# Patient Record
Sex: MALE | Race: WHITE | URBAN - METROPOLITAN AREA
[De-identification: names, ages, dates, MRNs, and addresses within clinical notes are randomized per-mention and may not be internally consistent; named-entity substitution may affect disease eponyms.]

---

## 2017-07-19 DIAGNOSIS — N35.9 URETHRAL STRICTURE, UNSPECIFIED STRICTURE TYPE: Primary | ICD-10-CM

## 2017-07-20 ENCOUNTER — OFFICE VISIT (OUTPATIENT)
Dept: UROLOGY | Facility: CLINIC | Age: 72
End: 2017-07-20
Payer: COMMERCIAL

## 2017-07-20 VITALS
WEIGHT: 260 LBS | SYSTOLIC BLOOD PRESSURE: 134 MMHG | HEIGHT: 70 IN | HEART RATE: 60 BPM | BODY MASS INDEX: 37.22 KG/M2 | DIASTOLIC BLOOD PRESSURE: 82 MMHG

## 2017-07-20 DIAGNOSIS — N35.9 URETHRAL STRICTURE, UNSPECIFIED STRICTURE TYPE: ICD-10-CM

## 2017-07-20 LAB
ALBUMIN UR-MCNC: 100 MG/DL
APPEARANCE UR: CLEAR
BILIRUB UR QL STRIP: NEGATIVE
COLOR UR AUTO: YELLOW
GLUCOSE UR STRIP-MCNC: NEGATIVE MG/DL
HGB UR QL STRIP: NEGATIVE
KETONES UR STRIP-MCNC: NEGATIVE MG/DL
LEUKOCYTE ESTERASE UR QL STRIP: NEGATIVE
NITRATE UR QL: NEGATIVE
PH UR STRIP: 5.5 PH (ref 5–7)
RESIDUAL VOLUME (RV) (EXTERNAL): 83
SP GR UR STRIP: >1.03 (ref 1–1.03)
URN SPEC COLLECT METH UR: ABNORMAL
UROBILINOGEN UR STRIP-ACNC: 0.2 EU/DL (ref 0.2–1)

## 2017-07-20 PROCEDURE — 51798 US URINE CAPACITY MEASURE: CPT | Performed by: UROLOGY

## 2017-07-20 PROCEDURE — 99212 OFFICE O/P EST SF 10 MIN: CPT | Mod: 25 | Performed by: UROLOGY

## 2017-07-20 PROCEDURE — 52281 CYSTOSCOPY AND TREATMENT: CPT | Performed by: UROLOGY

## 2017-07-20 PROCEDURE — 81003 URINALYSIS AUTO W/O SCOPE: CPT | Performed by: UROLOGY

## 2017-07-20 RX ORDER — CIPROFLOXACIN 500 MG/1
500 TABLET, FILM COATED ORAL 2 TIMES DAILY
Qty: 14 TABLET | Refills: 0 | Status: SHIPPED | OUTPATIENT
Start: 2017-07-20

## 2017-07-20 ASSESSMENT — PAIN SCALES - GENERAL: PAINLEVEL: NO PAIN (0)

## 2017-07-20 NOTE — NURSING NOTE
Prior to the start of the procedure and with procedural staff participation, I verbally confirmed the patient s identity using two indicators, relevant allergies, that the procedure was appropriate and matched the consent or emergent situation, and that the correct equipment/implants were available. Immediately prior to starting the procedure I conducted the Time Out with the procedural staff and re-confirmed the patient s name, procedure, and site/side. (The Joint Commission universal protocol was followed.)  Yes    Sedation (Moderate or Deep): None    Kandice Malcolm LPN

## 2017-07-20 NOTE — PROGRESS NOTES
This very pleasant 71-year-old gentleman returns today.  He has a long history of bladder neck contracture which has required dilatations intermittently in the past.  He has recently noticed stream slowing down significantly.  In early August he is due to have cardiovascular surgery.    Procedure.  Cystoscopy with urethral dilatation using Song sounds.  Surgeon.   Harman.  Anesthesia local anesthesia.  Description.  With the patient in the supine position, and the genital area prepped and draped in the customary fashion, with 2% lidocaine jelly in the urethra, the flexible cystoscope was carefully inserted into the urethra.  The penile urethra appeared satisfactory, but at the very proximal most proximal part of the urethra I could not negotiate the instrument further.  I passed there for an 035 Glidewire through this into the bladder withdrew the cystoscope and then dilated the urethra and bladder neck from 16 up to 24-Greek with the curved Song sounds.  At the completion of the procedure I left the wire in place and reinserted the cystoscope.  The penile urethra was now wide open.  The external sphincter intact, there was mild enlargement of the lateral lobes of the prostate around the apex, there was now a wide open bladder neck.  The interior the bladder was carefully inspected and no neoplasm or stone was seen in the bladder.  There were no other remarkable features.  We decided not to put a catheter in as no bleeding was identified.  I put on Cipro 500 mg twice a day for 5 days after this procedure.  Impression.  He has return on a p.r.n. basis in the past and again in the future and will see me again if symptoms deteriorate.  He is having surgery next week.  I would anticipate that this, procedure, today will make it much easier for them to put a catheter into his bladder prior to surgery after the induction of anesthesia.    Plan I will see him on a p.r.n. basis, i.e. when symptoms indicate as in the  "past.    Time.  Tenderness is present in addition to the procedure to discuss the findings to talk about postmedication to talk about information passed on to his surgeon who will do his heart surgery with regard to catheterization    \"This dictation was performed with voice recognition software and may contain errors,  omissions and inadvertent word substitution.\"    "

## 2017-07-20 NOTE — LETTER
7/20/2017      RE: Ap Greenwood  [address] [place] [address]       This very pleasant 71-year-old gentleman returns today.  He has a long history of bladder neck contracture which has required dilatations intermittently in the past.  He has recently noticed stream slowing down significantly.  In early August he is due to have cardiovascular surgery.    Procedure.  Cystoscopy with urethral dilatation using Song sounds.  Surgeon.   Harman.  Anesthesia local anesthesia.  Description.  With the patient in the supine position, and the genital area prepped and draped in the customary fashion, with 2% lidocaine jelly in the urethra, the flexible cystoscope was carefully inserted into the urethra.  The penile urethra appeared satisfactory, but at the very proximal most proximal part of the urethra I could not negotiate the instrument further.  I passed there for an 035 Glidewire through this into the bladder withdrew the cystoscope and then dilated the urethra and bladder neck from 16 up to 24-Jamaican with the curved Song sounds.  At the completion of the procedure I left the wire in place and reinserted the cystoscope.  The penile urethra was now wide open.  The external sphincter intact, there was mild enlargement of the lateral lobes of the prostate around the apex, there was now a wide open bladder neck.  The interior the bladder was carefully inspected and no neoplasm or stone was seen in the bladder.  There were no other remarkable features.  We decided not to put a catheter in as no bleeding was identified.  I put on Cipro 500 mg twice a day for 5 days after this procedure.  Impression.  He has return on a p.r.n. basis in the past and again in the future and will see me again if symptoms deteriorate.  He is having surgery next week.  I would anticipate that this, procedure, today will make it much easier for them to put a catheter into his bladder prior to surgery after the induction of  "anesthesia.    Plan I will see him on a p.r.n. basis, i.e. when symptoms indicate as in the past.    Time.  Tenderness is present in addition to the procedure to discuss the findings to talk about postmedication to talk about information passed on to his surgeon who will do his heart surgery with regard to catheterization    \"This dictation was performed with voice recognition software and may contain errors,  omissions and inadvertent word substitution.\"      Bandar Hammer MD      "

## 2017-07-20 NOTE — PATIENT INSTRUCTIONS
"     AFTER YOUR CYSTOSCOPY         You have just completed a cystoscopy, or \"cysto\", which allowed your physician to learn more about your bladder (or to remove a stent placed after surgery). We suggest that you continue to avoid caffeine, fruit juice, and alcohol for the next 24 hours, however, you are encouraged to return to your normal activities.       A few things that are considered normal after your cystoscopy:    * small amount of bleeding (or spotting) that clears within the next 24 hours    * slight burning sensation with urination    * sensation to of needing to avoid more frequently    * the feeling of \"air\" in your urine    * mild discomfort that is relieved with Tylonol        Please contact our office promptly if you:    * develop a fever above 101 degrees    * are unable to urinate    * develop bright red blood that does not stop    * severe pain or swelling        And of course, please contact our office with any concerns or questions [phone]        "

## 2017-07-20 NOTE — MR AVS SNAPSHOT
"              After Visit Summary   7/20/2017    Ap Greenwood    MRN: [id number]           Patient Information     Date Of Birth          1945        Visit Information        Provider Department      7/20/2017 1:50 PM Bandar Hammer MD [facility] [place]        Today's Diagnoses     Urethral stricture, unspecified stricture type          Care Instructions         AFTER YOUR CYSTOSCOPY         You have just completed a cystoscopy, or \"cysto\", which allowed your physician to learn more about your bladder (or to remove a stent placed after surgery). We suggest that you continue to avoid caffeine, fruit juice, and alcohol for the next 24 hours, however, you are encouraged to return to your normal activities.       A few things that are considered normal after your cystoscopy:    * small amount of bleeding (or spotting) that clears within the next 24 hours    * slight burning sensation with urination    * sensation to of needing to avoid more frequently    * the feeling of \"air\" in your urine    * mild discomfort that is relieved with Tylonol        Please contact our office promptly if you:    * develop a fever above 101 degrees    * are unable to urinate    * develop bright red blood that does not stop    * severe pain or swelling        And of course, please contact our office with any concerns or questions [phone]                Follow-ups after your visit        Follow-up notes from your care team     Return if symptoms worsen or fail to improve.      Who to contact     If you have questions or need follow up information about today's clinic visit or your schedule please contact [facility] [place] directly at [phone].  Normal or non-critical lab and imaging results will be communicated to you by MyChart, letter or phone within 4 business days after the clinic has received the results. If you do not hear from us within 7 " "days, please contact the clinic through [employer]â„¢ or phone. If you have a critical or abnormal lab result, we will notify you by phone as soon as possible.  Submit refill requests through [employer]â„¢ or call your pharmacy and they will forward the refill request to us. Please allow 3 business days for your refill to be completed.          Additional Information About Your Visit        [employer]â„¢ Information     [employer]â„¢ lets you send messages to your doctor, view your test results, renew your prescriptions, schedule appointments and more. To sign up, go to [email or web address] . Click on \"Log in\" on the left side of the screen, which will take you to the Welcome page. Then click on \"Sign up Now\" on the right side of the page.     You will be asked to enter the access code listed below, as well as some personal information. Please follow the directions to create your username and password.     Your access code is: [email or web address]  Expires: 10/18/2017  3:15 PM     Your access code will  in 90 days. If you need help or a new code, please call your [place] clinic or [phone].        Care EveryWhere ID     This is your Care EveryWhere ID. This could be used by other organizations to access your [place] medical records  [id number]        Your Vitals Were     Pulse Height BMI (Body Mass Index)             60 1.778 m (5' 10\") 37.31 kg/m2          Blood Pressure from Last 3 Encounters:   17 134/82   16 140/90    Weight from Last 3 Encounters:   17 117.9 kg (260 lb)   16 117.9 kg (260 lb)              We Performed the Following     CYSTOSCOPY W DIL URETHRAL STRICTURE/CALIBRATION (61006)     MEASURE POST-VOID RESIDUAL URINE/BLADDER CAPACITY, US NON-IMAGING (91133)     UA without Microscopic [[id number]]          Today's Medication Changes          These changes are accurate as of: 17  3:15 PM.  If you have any questions, ask your nurse or doctor.               Start taking these medicines.        " Dose/Directions    ciprofloxacin 500 MG tablet   Commonly known as:  CIPRO   Used for:  Urethral stricture, unspecified stricture type   Started by:  Bandar Hammer MD        Dose:  500 mg   Take 1 tablet (500 mg) by mouth 2 times daily   Quantity:  14 tablet   Refills:  0            Where to get your medicines      These medications [id number] [place] [place] [facility] [address] [facility]  [address] [address]     Phone:  [phone]     ciprofloxacin 500 MG tablet                Primary Care Provider Office Phone # Fax #    Memo BRISA Azevedo [phone]       [facility] [address] [address]        Equal Access to Services     DEJA FLOYD AH: Hadii mercy reyna hadasho Sovasquez, waaxda luqadaha, qaybta kaalmada adeegyada, waximan blackburn. [facility] [phone].    ATENCIÓN: Si habla español, tiene a flower disposición servicios gratuitos de asistencia lingüística. [facility] [phone].    We comply with applicable federal civil rights laws and [place] laws. We do not discriminate on the basis of race, color, national origin, age, disability sex, sexual orientation or gender identity.            Thank you!     Thank you for choosing [facility] [place]  for your care. Our goal is always to provide you with excellent care. Hearing back from our patients is one way we can continue to improve our services. Please take a few minutes to complete the written survey that you may receive in the mail after your visit with us. Thank you!             Your Updated Medication List - Protect others around you: Learn how to safely use, store and throw away your medicines at www.disposemymeds.org.          This list is accurate as of: 7/20/17  3:15 PM.  Always use your most recent med list.                   Brand Name Dispense Instructions for use Diagnosis    ALLERGY 4 MG tablet    Generic drug:  chlorpheniramine           amLODIPine 5 MG tablet    NORVASC     Take 5 mg by mouth        aspirin  MG EC tablet      Take 325 mg by mouth        atorvastatin 40 MG tablet    LIPITOR     Take 40 mg by mouth        cinnamon 500 MG Caps      Take 1,000 mg by mouth        ciprofloxacin 500 MG tablet    CIPRO    14 tablet    Take 1 tablet (500 mg) by mouth 2 times daily    Urethral stricture, unspecified stricture type       coenzyme Q-10 100 MG Caps      Take 100 mg by mouth        diazepam 2 MG tablet    VALIUM     Take 2 mg by mouth        finasteride 5 MG tablet    PROSCAR     Take 5 mg by mouth        HM VITAMIN D3 4000 UNITS Caps   Generic drug:  Cholecalciferol      Take 1 capsule by mouth        metoprolol 25 MG tablet    LOPRESSOR     Take 25 mg by mouth        pantoprazole 40 MG EC tablet    PROTONIX     Take 1 tablet by mouth twice daily before a meal.        ranitidine 300 MG tablet    ZANTAC     Take 300 mg by mouth

## 2017-07-20 NOTE — LETTER
7/20/2017       RE: Ap Greenwood  [address] [place] [address]     Dear Colleague,    Thank you for referring your patient, Ap Greenwood, to the [facility] [place] at [facility]. Please see a copy of my visit note below.    This very pleasant 71-year-old gentleman returns today.  He has a long history of bladder neck contracture which has required dilatations intermittently in the past.  He has recently noticed stream slowing down significantly.  In early August he is due to have cardiovascular surgery.    Procedure.  Cystoscopy with urethral dilatation using Song sounds.  Surgeon.   Harman.  Anesthesia local anesthesia.  Description.  With the patient in the supine position, and the genital area prepped and draped in the customary fashion, with 2% lidocaine jelly in the urethra, the flexible cystoscope was carefully inserted into the urethra.  The penile urethra appeared satisfactory, but at the very proximal most proximal part of the urethra I could not negotiate the instrument further.  I passed there for an 035 Glidewire through this into the bladder withdrew the cystoscope and then dilated the urethra and bladder neck from 16 up to 24-Peruvian with the curved Song sounds.  At the completion of the procedure I left the wire in place and reinserted the cystoscope.  The penile urethra was now wide open.  The external sphincter intact, there was mild enlargement of the lateral lobes of the prostate around the apex, there was now a wide open bladder neck.  The interior the bladder was carefully inspected and no neoplasm or stone was seen in the bladder.  There were no other remarkable features.  We decided not to put a catheter in as no bleeding was identified.  I put on Cipro 500 mg twice a day for 5 days after this procedure.  Impression.  He has return on a p.r.n. basis in the past and again in the future and will see  "me again if symptoms deteriorate.  He is having surgery next week.  I would anticipate that this, procedure, today will make it much easier for them to put a catheter into his bladder prior to surgery after the induction of anesthesia.    Plan I will see him on a p.r.n. basis, i.e. when symptoms indicate as in the past.    Time.  Tenderness is present in addition to the procedure to discuss the findings to talk about postmedication to talk about information passed on to his surgeon who will do his heart surgery with regard to catheterization    \"This dictation was performed with voice recognition software and may contain errors,  omissions and inadvertent word substitution.\"      Again, thank you for allowing me to participate in the care of your patient.      Sincerely,    Bandar Hammer MD      "

## 2019-06-26 ENCOUNTER — MEDICAL CORRESPONDENCE (OUTPATIENT)
Dept: HEALTH INFORMATION MANAGEMENT | Facility: CLINIC | Age: 74
End: 2019-06-26

## 2019-06-27 DIAGNOSIS — Z87.448 H/O URETHRAL STRICTURE: Primary | ICD-10-CM

## 2019-07-01 ENCOUNTER — OFFICE VISIT (OUTPATIENT)
Dept: UROLOGY | Facility: CLINIC | Age: 74
End: 2019-07-01
Payer: MEDICARE

## 2019-07-01 ENCOUNTER — TELEPHONE (OUTPATIENT)
Dept: UROLOGY | Facility: CLINIC | Age: 74
End: 2019-07-01

## 2019-07-01 VITALS
OXYGEN SATURATION: 96 % | HEART RATE: 63 BPM | WEIGHT: 265 LBS | DIASTOLIC BLOOD PRESSURE: 88 MMHG | BODY MASS INDEX: 39.25 KG/M2 | SYSTOLIC BLOOD PRESSURE: 144 MMHG | HEIGHT: 69 IN

## 2019-07-01 DIAGNOSIS — Z87.448 H/O URETHRAL STRICTURE: ICD-10-CM

## 2019-07-01 LAB
ALBUMIN UR-MCNC: 100 MG/DL
APPEARANCE UR: CLEAR
BILIRUB UR QL STRIP: ABNORMAL
COLOR UR AUTO: YELLOW
GLUCOSE UR STRIP-MCNC: NEGATIVE MG/DL
HGB UR QL STRIP: NEGATIVE
KETONES UR STRIP-MCNC: 15 MG/DL
LEUKOCYTE ESTERASE UR QL STRIP: NEGATIVE
NITRATE UR QL: NEGATIVE
PH UR STRIP: 5.5 PH (ref 5–7)
SOURCE: ABNORMAL
SP GR UR STRIP: >1.03 (ref 1–1.03)
UROBILINOGEN UR STRIP-ACNC: 0.2 EU/DL (ref 0.2–1)

## 2019-07-01 PROCEDURE — 99215 OFFICE O/P EST HI 40 MIN: CPT | Mod: 25 | Performed by: UROLOGY

## 2019-07-01 PROCEDURE — 81003 URINALYSIS AUTO W/O SCOPE: CPT | Performed by: UROLOGY

## 2019-07-01 PROCEDURE — 52281 CYSTOSCOPY AND TREATMENT: CPT | Performed by: UROLOGY

## 2019-07-01 RX ORDER — CIPROFLOXACIN 500 MG/1
500 TABLET, FILM COATED ORAL 2 TIMES DAILY
Qty: 6 TABLET | Refills: 0 | Status: SHIPPED | OUTPATIENT
Start: 2019-07-01 | End: 2019-07-03

## 2019-07-01 RX ORDER — CHLORAL HYDRATE 500 MG
2 CAPSULE ORAL DAILY
COMMUNITY

## 2019-07-01 ASSESSMENT — PAIN SCALES - GENERAL: PAINLEVEL: NO PAIN (0)

## 2019-07-01 ASSESSMENT — MIFFLIN-ST. JEOR: SCORE: 1937.41

## 2019-07-01 NOTE — PROGRESS NOTES
This very pleasant 73-year-old gentleman returns today for follow-up.  He has a long history of bladder neck contracture which is required dilatation in the past intermittently.  It has been 2 years since the last dilatation, but his stream is slowing down once again.  More recently he has been diagnosed with an aortic valve thrombus.  He is now on anticoagulation.  He was taking Lovenox up until yesterday  He also has been having some back problems and is anticipating the possibility of back surgery again     Procedure.  Cystoscopy with dilatation of urethral stricture and bladder neck contracture   Surgeon.    Harman  Anesthesia.  Local anesthesia.  Description.  With the patient in the supine position, with the genital area prepped and draped in the customary fashion, with local anesthetic and the urethra, the flexible cystoscope was Inserted.  The penile urethra was carefully inspected.  At the site of the sphincter was significant stenosis.  I passed a Glidewire through this into the bladder.  I withdrew the cystoscope.  I then dilated the stricture from 16 up to 24 Divehi with a curved Song sounds over the guidewire.  I then reinserted the cystoscope.  The stricture was completely dilated.  The prostatic urethra did show some regrowth of prostate allowing glad to say following dilatation the bladder neck itself is wide open, having had a transurethral incision of the prostate about 20 years ago.  I inspected the bladder carefully, there was a very mild bladder trabeculation, there was no evidence of neoplasm or stone in the bladder, there were no other remarkable features.    Impression.  We will need to continue surveillance probably about every 2 years.  The stricture does have a tendency to try and recover at about that frequency.  At his request we are going to give him Cipro for 5 doses.  He will need to go back on Lovenox and start Coumadin as soon as it is reasonable I would think within 48 hours if  "that is acceptable to his cardiologist but I will defer to the judgment.  There was no bleeding after the procedure today.    We did carefully discussed the entire situation regarding long-term management and he is content to continue this way given that dilatation is relatively infrequent.  There is some regrowth of the prostate which I have noted but I see no indication for reconsidering further surgical management to that at this point.  I did discuss the entire situation very carefully with the patient in detail today.  We had a careful discussion about how to manage his anticoagulants although he will need to call the cardiology clinic tomorrow morning to verify current plans for return to anticoagulation.  I answered all his questions.    Plan.  2 years for cystoscopy and repeat urethral dilatation.    Time.  50 minutes spent in addition to the procedure not under to carefully review the records and any other pertinent lab studies but have a careful discussion following the procedure about the need for ongoing surveillance, discussions about issues related to the prostate in addition to the stricture and a discussion about anticoagulation management and antibiotic selection.  (He was allergic to Floxin at one point but has tolerated Cipro without any problems many times since and therefore we will put him on Cipro today)    \"This dictation was performed with voice recognition software and may contain errors,  omissions and inadvertent word substitution.\"      "

## 2019-07-01 NOTE — LETTER
7/1/2019       RE: Ap Greenwood  [address] [place] [address]     Dear Colleague,    Thank you for referring your patient, Ap Greenwood, to the [facility] [place] at [facility]. Please see a copy of my visit note below.    This very pleasant 73-year-old gentleman returns today for follow-up.  He has a long history of bladder neck contracture which is required dilatation in the past intermittently.  It has been 2 years since the last dilatation, but his stream is slowing down once again.  More recently he has been diagnosed with an aortic valve thrombus.  He is now on anticoagulation.  He was taking Lovenox up until yesterday  He also has been having some back problems and is anticipating the possibility of back surgery again     Procedure.  Cystoscopy with dilatation of urethral stricture and bladder neck contracture   Surgeon.    [place]  Anesthesia.  Local anesthesia.  Description.  With the patient in the supine position, with the genital area prepped and draped in the customary fashion, with local anesthetic and the urethra, the flexible cystoscope was Inserted.  The penile urethra was carefully inspected.  At the site of the sphincter was significant stenosis.  I passed a Glidewire through this into the bladder.  I withdrew the cystoscope.  I then dilated the stricture from 16 up to 24 Czech with a curved Song sounds over the guidewire.  I then reinserted the cystoscope.  The stricture was completely dilated.  The prostatic urethra did show some regrowth of prostate allowing glad to say following dilatation the bladder neck itself is wide open, having had a transurethral incision of the prostate about 20 years ago.  I inspected the bladder carefully, there was a very mild bladder trabeculation, there was no evidence of neoplasm or stone in the bladder, there were no other remarkable features.    Impression.  We will  "need to continue surveillance probably about every 2 years.  The stricture does have a tendency to try and recover at about that frequency.  At his request we are going to give him Cipro for 5 doses.  He will need to go back on Lovenox and start Coumadin as soon as it is reasonable I would think within 48 hours if that is acceptable to his cardiologist but I will defer to the judgment.  There was no bleeding after the procedure today.    We did carefully discussed the entire situation regarding long-term management and he is content to continue this way given that dilatation is relatively infrequent.  There is some regrowth of the prostate which I have noted but I see no indication for reconsidering further surgical management to that at this point.  I did discuss the entire situation very carefully with the patient in detail today.  We had a careful discussion about how to manage his anticoagulants although he will need to call the cardiology clinic tomorrow morning to verify current plans for return to anticoagulation.  I answered all his questions.    Plan.  2 years for cystoscopy and repeat urethral dilatation.    Time.  50 minutes spent in addition to the procedure not under to carefully review the records and any other pertinent lab studies but have a careful discussion following the procedure about the need for ongoing surveillance, discussions about issues related to the prostate in addition to the stricture and a discussion about anticoagulation management and antibiotic selection.  (He was allergic to Floxin at one point but has tolerated Cipro without any problems many times since and therefore we will put him on Cipro today)    \"This dictation was performed with voice recognition software and may contain errors,  omissions and inadvertent word substitution.\"        Again, thank you for allowing me to participate in the care of your patient.      Sincerely,    Bandar Hammer MD      "

## 2019-07-01 NOTE — PATIENT INSTRUCTIONS
"AFTER YOUR CYSTOSCOPY  ?  ?  You have just completed a cystoscopy, or \"cysto\", which allowed your physician to learn more about your bladder (or to remove a stent placed after surgery). We suggest that you continue to avoid caffeine, fruit juice, and alcohol for the next 24 hours, however, you are encouraged to return to your normal activities.  ?  ?  A few things that are considered normal after your cystoscopy:  ?  * small amount of bleeding (or spotting) that clears within the next 24 hours  ?  * slight burning sensation with urination  ?  * sensation of needing to void (urinate) more frequently  ?  * the feeling of \"air\" in your urine  ?  * mild discomfort that is relieved with Tylenol    * bladder spasms  ?  ?  ?  Please contact our office promptly if you:  ?  * develop a fever above 101 degrees  ?  * are unable to urinate  ?  * develop bright red blood that does not stop  ?  * experience severe pain or swelling  ?  ?  ?  And of course, please contact our office with any concerns or questions [phone]  ?    AFTER YOUR CYSTOSCOPY        You have just completed a cystoscopy, or \"cysto\", which allowed your physician to learn more about your bladder (or to remove a stent placed after surgery). We suggest that you continue to avoid caffeine, fruit juice, and alcohol for the next 24 hours, however, you are encouraged to return to your normal activities.         A few things that are considered normal after your cystoscopy:     * Small amount of bleeding (or spotting) that clears within the next 24 hours     * Slight burning sensation with urination     * Sensation to of needing to avoid more frequently     * The feeling of \"air\" in your urine     * Mild discomfort that is relieved with Tylenol        Please contact our office promptly if you:     * Develop a fever above 101 degrees     * Are unable to urinate     * Develop bright red blood that does not stop     * Severe pain or swelling         Please contact " our office with any concerns or questions @[email or web address].

## 2019-07-01 NOTE — TELEPHONE ENCOUNTER
Called pharmacy; question answered.    [facility] Call Center    Phone Message    May a detailed message be left on voicemail: yes    Reason for Call: Other: Pharmacy has questions about cipiro 500mg tablet, please call to verify     Action Taken: Message routed to:  [facility] ([facility]): [place]

## 2019-07-01 NOTE — NURSING NOTE
Chief Complaint   Patient presents with     Cystoscopy     Pt here for cysto for uretheral stricture     Prior to the start of the procedure and with procedural staff participation, I verbally confirmed the patient s identity using two indicators, relevant allergies, that the procedure was appropriate and matched the consent or emergent situation, and that the correct equipment/implants were available. Immediately prior to starting the procedure I conducted the Time Out with the procedural staff and re-confirmed the patient s name, procedure, and site/side. I have wiped the patient off with the povidone-Iodine solution, draped them,  used Lidocaine hydrochloride jelly, and instilled sterile water into the bladder. (The Joint Commission universal protocol was followed.)  Yes    Sedation (Moderate or Deep): None  UA RESULTS:  Recent Labs   Lab Test 07/01/19  0859   COLOR Yellow   APPEARANCE Clear   URINEGLC Negative   URINEBILI Small*   URINEKETONE 15*   SG >1.030   UBLD Negative   URINEPH 5.5   PROTEIN 100*   UROBILINOGEN 0.2   NITRITE Negative   LEUKEST Negative       Kathrine Cevallos